# Patient Record
Sex: MALE | NOT HISPANIC OR LATINO | Employment: FULL TIME | URBAN - METROPOLITAN AREA
[De-identification: names, ages, dates, MRNs, and addresses within clinical notes are randomized per-mention and may not be internally consistent; named-entity substitution may affect disease eponyms.]

---

## 2020-02-17 ENCOUNTER — HOSPITAL ENCOUNTER (OUTPATIENT)
Dept: URGENT CARE | Facility: CLINIC | Age: 20
Discharge: HOME | End: 2020-02-17
Attending: NURSE PRACTITIONER

## 2020-02-20 LAB — BACTERIA SPEC AEROBE CULT: NORMAL

## 2021-05-07 ENCOUNTER — OFFICE VISIT CONVERTED (OUTPATIENT)
Dept: OTOLARYNGOLOGY | Facility: CLINIC | Age: 21
End: 2021-05-07
Attending: NURSE PRACTITIONER

## 2021-06-05 NOTE — H&P
History and Physical      Patient Name: Steven Collins   Patient ID: [id number]   Sex: Male   YOB: 2000    Primary Care Provider: Celine Alas   Referring Provider: Celine Alas    Visit Date: May 7, 2021    Provider: CAL Del Valle   Location: [facility]   Location Address: [address] [place] [address], [address] [place] [address]  [id number]   Location Phone: [phone]          Chief Complaint     1. epistaxis       History Of Present Illness  Steven Collins is a 20 year old /White male who presents to the office today as a consult from Celine Alas.      He presents to the clinic today for evaluation of epistaxis.  He states that this is been an ongoing issue since his teenage years.  He states he will have episodes of bleeding and then go many months without bleeding.  He states his last nosebleed was approximately 3 weeks ago and prior to that he had 3 or 4 in 1 week.  He reports that it always bleeds out of the left side.  He does report some chronic nasal congestion and often feels like he has a hard time breathing through his nose.  This is not worse on one particular side.  He denies any bleeding disorders and is not on any blood thinners.  He denies any nasal trauma.       Past Medical History  Epistaxis         Past Surgical History  Winslow Tooth Extraction         Medication List  cetirizine 10 mg oral tablet; Ocean Nasal 0.65 % nasal aerosol,spray         Allergy List  NO KNOWN DRUG ALLERGIES         Family Medical History  Family history of breast cancer; Family history of prostate cancer; Family history of diabetes mellitus         Social History  Tobacco (Never); Vapes (Former)         Review of Systems  · Constitutional  o Denies  o : fever, night sweats, weight loss  · Eyes  o Denies  o : discharge from eye, impaired vision  · HENT  o Admits  o : *See HPI  · Cardiovascular  o Denies  o : chest pain, irregular heart beats  · Respiratory  o Denies  o : shortness of  breath, wheezing, coughing up blood  · Gastrointestinal  o Denies  o : heartburn, reflux, vomiting blood  · Genitourinary  o Denies  o : frequency  · Integument  o Denies  o : rash, skin dryness  · Neurologic  o Admits  o : dizziness  o Denies  o : seizures, loss of balance, loss of consciousness  · Endocrine  o Denies  o : cold intolerance, heat intolerance  · Heme-Lymph  o Denies  o : easy bleeding, anemia      Vitals  Date Time BP Position Site L\R Cuff Size HR RR TEMP (F) WT  HT  BMI kg/m2 BSA m2 O2 Sat FR L/min FiO2 HC       05/07/2021 10:54 AM        97.7 171lbs 6oz 6'   23.24 1.99             Physical Examination  · Constitutional  o Appearance  o : well developed, well-nourished, alert and in no acute distress, voice clear and strong  · Head and Face  o Head  o :   § Inspection  § : no deformities or lesions  o Face  o :   § Inspection  § : No facial lesions; House-Brackmann I/VI bilaterally  § Palpation  § : No TMJ crepitus nor  muscle tenderness bilaterally  · Eyes  o Vision  o :   § Visual Fields  § : Extraocular movements are intact. No spontaneous or gaze-induced nystagmus.  o Conjunctivae  o : clear  o Sclerae  o : clear  o Pupils and Irises  o : pupils equal, round, and reactive to light.   · Ears, Nose, Mouth and Throat  o Ears  o :   § External Ears  § : appearance within normal limits, no lesions present  § Otoscopic Examination  § : tympanic membrane appearance within normal limits bilaterally without perforations, well-aerated middle ears  § Hearing  § : intact to conversational voice both ears  o Nose  o :   § External Nose  § : appearance normal  § Intranasal Exam  § : Prominent superficial blood vessels present on bilateral anterior nasal septums, worse on the left anterior septum, no intranasal lesions present, septum midline, sinuses non tender to percussion  o Oral Cavity  o :   § Oral Mucosa  § : oral mucosa normal without pallor or cyanosis  § Lips  § : lip appearance  normal  § Teeth  § : normal dentition for age  § Gums  § : gums pink, non-swollen, no bleeding present  § Tongue  § : tongue appearance normal; normal mobility  § Palate  § : hard palate normal, soft palate appearance normal with symmetric mobility  o Throat  o :   § Oropharynx  § : no inflammation or lesions present, tonsils within normal limits  · Neck  o Inspection/Palpation  o : normal appearance, no masses or tenderness, trachea midline; thyroid size normal, nontender, no nodules or masses present on palpation  · Respiratory  o Respiratory Effort  o : breathing unlabored  o Inspection of Chest  o : normal appearance, no retractions  · Lymphatic  o Neck  o : no lymphadenopathy present  o Supraclavicular Nodes  o : no lymphadenopathy present  o Preauricular Nodes  o : no lymphadenopathy present  · Skin and Subcutaneous Tissue  o General Inspection  o : Regarding face and neck - there are no rashes present, no lesions present, and no areas of discoloration  · Neurologic  o Cranial Nerves  o : cranial nerves II-XII are grossly intact bilaterally  o Gait and Station  o : normal gait, able to stand without diffculty  · Psychiatric  o Judgement and Insight  o : judgment and insight intact  o Mood and Affect  o : mood normal, affect appropriate          Assessment  · Epistaxis     784.7/R04.0      Plan  · Orders  o Nasal endoscopy [facility] ([id number]) - 784.7/R04.0 - 05/07/2021  · Medications  o Medications have been Reconciled  o Transition of Care or Provider Policy  · Instructions  o He presents to the clinic today for evaluation of epistaxis. He states that this is been an ongoing issue since his teenage years. He states he will have episodes of bleeding and then go many months without bleeding. He states his last nosebleed was approximately 3 weeks ago and prior to that he had 3 or 4 in 1 week. He reports that it always bleeds out of the left side. He does report some chronic nasal congestion and often feels like he has a  hard time breathing through his nose. This is not worse on one particular side. He denies any bleeding disorders and is not on any blood thinners. He denies any nasal trauma. On examination today he has superficial prominent blood vessels of present along anterior nasal septum bilaterally, worse on the left anterior nasal septum. I did perform a nasal endoscopy to look back further in the nose and this exam was normal. Based on his bleeding history from the left side we have discussed doing nasal cauterization using silver nitrate along the left anterior nasal septum. He would like to do this but does not want to do this today. I will have him get scheduled at his convenience and will perform nasal cautery. I will have him use nasal saline spray daily in the meantime.  o Electronically Identified Patient Education Materials Provided Electronically  · Correspondence  o ENT Letter to Referring MD (Celine Alas) - 05/07/2021            Electronically Signed by: CAL Del Valle -Author on May 7, 2021 11:44:53 AM

## 2021-06-08 ENCOUNTER — OFFICE VISIT (OUTPATIENT)
Dept: OTOLARYNGOLOGY | Facility: CLINIC | Age: 21
End: 2021-06-08

## 2021-06-08 VITALS — WEIGHT: 168.2 LBS | TEMPERATURE: 97.7 F | BODY MASS INDEX: 22.78 KG/M2 | HEIGHT: 72 IN

## 2021-06-08 DIAGNOSIS — R04.0 EPISTAXIS: Primary | ICD-10-CM

## 2021-06-08 PROBLEM — J30.9 ALLERGIC RHINITIS: Status: ACTIVE | Noted: 2021-06-08

## 2021-06-08 PROBLEM — F41.1 GENERALIZED ANXIETY DISORDER: Status: ACTIVE | Noted: 2021-06-08

## 2021-06-08 PROCEDURE — 30901 CONTROL OF NOSEBLEED: CPT | Performed by: NURSE PRACTITIONER

## 2021-06-08 RX ORDER — ECHINACEA PURPUREA EXTRACT 125 MG
TABLET ORAL EVERY 12 HOURS SCHEDULED
COMMUNITY
Start: 2021-04-20

## 2021-06-08 RX ORDER — BUSPIRONE HYDROCHLORIDE 5 MG/1
TABLET ORAL EVERY 8 HOURS SCHEDULED
COMMUNITY
Start: 2021-05-18

## 2021-06-08 NOTE — PROGRESS NOTES
"Patient Name: Steven Collins   Visit Date: 06/08/2021   Patient ID: [id number]  Provider: CAL Del Valle    Sex: male  Location: [facility]   YOB: 2000  Location Address: [address] [place] [address], [address] [place] [address],?[place]?[id number]    Primary Care Provider Celine Castellano APRN  Location Phone: [phone]    Referring Provider: No ref. provider found        Chief Complaint  Nose Bleed (f/u)    Subjective          Steven Collins is a 20 y.o. male who presents to [facility] for a follow-up visit of epistaxis.  Patient was last seen on 5/7/2021 at which time he was having persistent left-sided epistaxis.  On examination that day he did have a prominent superficial blood vessel present along the left anterior nasal septum.  We discussed performing nasal cautery but he did not want to have this done on that day.  He returns today to have nasal cautery performed.  He has not had any nosebleeds since last being seen.      Current Outpatient Medications on File Prior to Visit   Medication Sig   • busPIRone (BUSPAR) 5 MG tablet Every 8 (Eight) Hours.   • sodium chloride (Ocean Nasal Spray) 0.65 % nasal spray Every 12 (Twelve) Hours.     No current facility-administered medications on file prior to visit.        Social History     Tobacco Use   • Smoking status: Never Smoker   • Smokeless tobacco: Never Used   Vaping Use   • Vaping Use: Former   • Quit date: 1/1/2019   Substance Use Topics   • Alcohol use: Not on file   • Drug use: Not on file        Objective     Vital Signs:   Temp 97.7 °F (36.5 °C) (Tympanic)   Ht 182.9 cm (72\")   Wt 76.3 kg (168 lb 3.2 oz)   BMI 22.81 kg/m²       Physical Exam  Constitutional:       General: He is not in acute distress.     Appearance: Normal appearance. He is not ill-appearing.   HENT:      Head: Normocephalic and atraumatic.      Jaw: There is normal jaw occlusion. No tenderness or pain on movement.      Salivary " Glands: Right salivary gland is not diffusely enlarged or tender. Left salivary gland is not diffusely enlarged or tender.      Right Ear: Tympanic membrane, ear canal and external ear normal.      Left Ear: Tympanic membrane, ear canal and external ear normal.      Nose: Nose normal. No septal deviation.      Right Sinus: No maxillary sinus tenderness or frontal sinus tenderness.      Left Sinus: No maxillary sinus tenderness or frontal sinus tenderness.      Comments: Prominent superficial blood vessel present along left anterior nasal septum, cauterized using silver nitrate solution, neutralized, Triple Antibiotic ointment applied     Mouth/Throat:      Lips: Pink. No lesions.      Mouth: Mucous membranes are moist. No oral lesions.      Dentition: Normal dentition.      Tongue: No lesions.      Palate: No mass and lesions.      Pharynx: Oropharynx is clear. Uvula midline.      Tonsils: No tonsillar exudate.   Eyes:      Extraocular Movements: Extraocular movements intact.      Conjunctiva/sclera: Conjunctivae normal.      Pupils: Pupils are equal, round, and reactive to light.   Neck:      Thyroid: No thyroid mass, thyromegaly or thyroid tenderness.      Trachea: Trachea normal.   Pulmonary:      Effort: Pulmonary effort is normal. No respiratory distress.   Musculoskeletal:         General: Normal range of motion.      Cervical back: Normal range of motion and neck supple. No tenderness.   Lymphadenopathy:      Cervical: No cervical adenopathy.   Skin:     General: Skin is warm and dry.   Neurological:      General: No focal deficit present.      Mental Status: He is alert and oriented to person, place, and time.      Cranial Nerves: No cranial nerve deficit.      Motor: No weakness.      Gait: Gait normal.   Psychiatric:         Mood and Affect: Mood normal.         Behavior: Behavior normal.         Thought Content: Thought content normal.         Judgment: Judgment normal.          Control of  Epistaxis    Date/Time: 6/8/2021 3:03 PM  Performed by: Katharine Sexton APRN  Authorized by: Katharine Sexton APRN   Preparation: Patient was prepped and draped in the usual sterile fashion.  Local anesthesia used: no    Anesthesia:  Local anesthesia used: no  Patient tolerance: patient tolerated the procedure well with no immediate complications  Comments: Topical lidocaine used.  Nasal cautery with silver nitrate applied to left anterior nasal septum and neutralized.           Result Review :               Assessment and Plan    Diagnoses and all orders for this visit:    1. Epistaxis (Primary)  -     Control of Epistaxis          Follow Up   Return in about 6 weeks (around 7/20/2021).  Patient was given instructions and counseling regarding his condition or for health maintenance advice. Please see specific information pulled into the AVS if appropriate.

## 2021-06-08 NOTE — PATIENT INSTRUCTIONS
Nosebleed Fact Sheet    Nosebleeds are a common problem that we see in our clinic and can occur in any age group.  They can range from spotty bleeding to episodes of uncontrolled profuse bleeding.  Multiple medical conditions can contribute to nosebleeds and fortunately most of these can be controlled to limit and/or eliminate these bleeding episodes.    Most commonly bleeding occurs in the anterior or front part of the nose on the septum, or center wall of the nose.  This is because this area has several blood vessels vulnerable to both the drying effect of breathing, and to finger trauma of nose picking.  When this area become dry, the lining of the nose in this area can crack and cause the blood vessels underneath to bleed.    The following condition can contribute to and cause nosebleeds:  1. High Blood Pressure - When the blood pressure is high, the increased pressure can cause blood to be more easily pushed through a damaged blood vessel.  If your blood pressure is uncontrolled over 140 systolic, you may need to consult your primary-care physician to help limit your nosebleeds.  2. Low Platelets and Blood Clotting Factors - Certain medical conditions such as cancer and bleeding disorders can interfere with the body’s ability to form blood clots.  This interferes with the body’s own ability to stop nosebleeds.  3. Medications - Aspirin and aspirin type products such as nonsteroidal anti-inflammatory medications can interfere with body’s ability to form blood clots.  Nonsteroidal anti-inflammatory medications include medicines like ibuprofen (Motrin), naprosyn (Aleve), and Goody’s and BC powder.  Several cold and flu remedies also include aspirin.  If there’s a question, please ask you doctor or pharmacist.  Recently, it has been shown that some herbal medications, such as high doses of Vitamin E, can also interfere with the body’s ability to form clots.  Often times, these types of medications need to be  discontinued to help with nosebleed prevention.  4. Dryness - The nose needs to stay nice and moist to try to prevent any kind of cracking or injury to the nasal lining and underlying blood vessels.  The worst time of year for nosebleeds is in the wintertime when the humidity is low.  Occasionally, a nasal deviation can cause abnormal airflow that dries out an area on the septum and cause a nosebleed.  5. Trauma -One of the most common reasons for nosebleeds is trauma caused by nose picking or external injury.  If an area in your nose is irritated, scratching or picking it can cause it to easily bleed.  Recommendations for Preventing Nosebleeds:  1. Keep well hydrated by drinking at least six to eight glasses of water a day.  2. Increase the moisture of the nose by placing Vaseline in the front of the nose twice a day and irrigating the nose with nasal saline spray often (every 1-2 hrs).  3. Hold on taking aspirin or aspirin type products.  4. If you have high blood pressure, make sure this is well controlled.  5. Avoid nose picking and other forms of nasal trauma.  What to Do if Your Nose Bleeds:  1. Spray Afrin or a similar 12 hr nasal decongestant into the side that is bleeding.  2. With your thumb and forefinger, grasp the fleshy part of the nose and hold firm pressure.  If the bleeding is on the front part of the nose, it should make it stop.  Hold this pressure for 5 minutes on the clock then release.  If the nose is still bleeding, repeat.  If the nose is still bleeding after trying this 2-3 times, you may need to go to the emergency room for evaluation.  3. Call your doctor or go to the emergency room if you cannot get the bleeding to stop or if you feel dizzy or lightheaded after a large bleed.      Nosebleed, Adult  A nosebleed is when blood comes out of the nose. Nosebleeds are common. Usually, they are not a sign of a serious condition.  Nosebleeds can happen if a blood vessel in your nose starts to  bleed or if the lining of your nose (mucous membrane) cracks. They are commonly caused by:  · Allergies.  · Colds.  · Picking your nose.  · Blowing your nose too hard.  · An injury from sticking an object into your nose or getting hit in the nose.  · Dry or cold air.  Less common causes of nosebleeds include:  · Toxic fumes.  · Something abnormal in the nose or in the air-filled spaces in the bones of the face (sinuses).  · Growths in the nose, such as polyps.  · Blood thinners or conditions that cause blood to clot slowly.  · Certain illnesses or procedures that irritate or dry out the nasal passages.  Follow these instructions at home:  When you have a nosebleed:    · Sit down and tilt your head slightly forward.  · Use a clean towel or tissue to pinch your nostrils under the bony part of your nose. After 5 minutes, let go of your nose and see if bleeding starts again. Do not release pressure before that time. If there is still bleeding, repeat the pinching and holding for 5 minutes or until the bleeding stops.  · Do not place tissues or gauze in the nose to stop the bleeding.  · Avoid lying down and avoid tilting your head backward. That may make blood collect in the throat and cause gagging or coughing.  · Use a nasal spray decongestant to help with a nosebleed as told by your health care provider.  After a nosebleed:  1. Avoid blowing your nose or sniffing for a number of hours.  2. Avoid straining, lifting, or bending at the waist for several days. You may go back to other normal activities as you are able.  3. If you are taking aspirin or blood thinners and you have nosebleeds, talk to your health care provider. These medicines make bleeding more likely.  ? Ask your health care provider if you should stop taking the medicines or if you should adjust the dose.  ? Do not stop taking medicines that your health care provider has recommended unless he or she tells you to stop taking them.  4. If your nosebleed was  caused by dry mucous membranes, use over-the-counter saline nasal spray or gel and a humidifier as told by your health care provider. This will keep the mucous membranes moist and allow them to heal. If you need to use one of these products:  ? Choose one that is water-soluble.  ? Use only as much as you need and use it only as often as needed.  ? Do not lie down right after you use it.  5. If you get nosebleeds often, talk with your health care provider about medical treatments. Options may include:  ? Nasal cautery. This treatment stops and prevents nosebleeds by using a chemical swab or electrical device to lightly burn tiny blood vessels inside the nose.  ? Nasal packing. A gauze or other material is placed in the nose to keep constant pressure on the bleeding area.  Contact a health care provider if you:  · Have a fever.  · Get nosebleeds often or more often than usual.  · Bruise very easily.  · Have a nosebleed from having something stuck in your nose.  · Have bleeding in your mouth.  · Vomit or cough up brown material.  · Have a nosebleed after you start a new medicine.  Get help right away if:  · You have a nosebleed after a fall or a head injury.  · Your nosebleed does not go away after 20 minutes.  · You feel dizzy or weak.  · You have unusual bleeding from other parts of your body.  · You have unusual bruising on other parts of your body.  · You become sweaty.  · You vomit blood.  Summary  · A nosebleed is when blood comes out of the nose. Common causes include allergies, an injury to the nose, or cold or dry air.  · Initial treatment includes applying pressure for 5 minutes.  · Moisturizing the nose with saline nasal spray or gel after a nosebleed may help prevent future bleeding.  · Get help right away if your nosebleed does not go away after 20 minutes.  This information is not intended to replace advice given to you by your health care provider. Make sure you discuss any questions you have with your  health care provider.

## 2021-07-15 VITALS — BODY MASS INDEX: 23.21 KG/M2 | WEIGHT: 171.37 LBS | TEMPERATURE: 97.7 F | HEIGHT: 72 IN

## 2021-07-27 ENCOUNTER — OFFICE VISIT (OUTPATIENT)
Dept: OTOLARYNGOLOGY | Facility: CLINIC | Age: 21
End: 2021-07-27

## 2021-07-27 VITALS — WEIGHT: 171.6 LBS | HEIGHT: 72 IN | TEMPERATURE: 97.8 F | BODY MASS INDEX: 23.24 KG/M2

## 2021-07-27 DIAGNOSIS — R04.0 EPISTAXIS: Primary | ICD-10-CM

## 2021-07-27 PROBLEM — J45.20 MILD INTERMITTENT ASTHMA: Status: ACTIVE | Noted: 2021-07-27

## 2021-07-27 PROBLEM — G43.909 MIGRAINE: Status: ACTIVE | Noted: 2021-07-27

## 2021-07-27 PROBLEM — R41.840 ATTENTION AND CONCENTRATION DEFICIT: Status: ACTIVE | Noted: 2021-07-27

## 2021-07-27 PROBLEM — F33.9 RECURRENT MAJOR DEPRESSION: Status: ACTIVE | Noted: 2021-07-27

## 2021-07-27 PROCEDURE — 99212 OFFICE O/P EST SF 10 MIN: CPT | Performed by: NURSE PRACTITIONER

## 2021-07-27 RX ORDER — CETIRIZINE HYDROCHLORIDE 10 MG/1
10 TABLET ORAL DAILY
COMMUNITY
Start: 2021-04-20

## 2021-07-27 NOTE — PROGRESS NOTES
Patient Name: Steven Collins   Visit Date: 07/27/2021   Patient ID: [id number]  Provider: CAL Del Valle    Sex: male  Location: [facility]   YOB: 2000  Location Address: [address] [place] [address], [address] [place] [address],?[place]?[id number]    Primary Care Provider Celine Castellano APRN  Location Phone: [phone]    Referring Provider: No ref. provider found        Chief Complaint  No chief complaint on file.    Subjective          Steven Collins is a 21 y.o. male who presents to [facility] for a follow-up visit of left-sided epistaxis.  He was last seen on 6/8/2021 at which time I performed nasal cautery to the left-sided anterior nasal septum.  He states that since that time he has only had 1 small nosebleed from the left side.  He states that it stopped bleeding within a couple minutes.  He reports he has been doing well.      Current Outpatient Medications on File Prior to Visit   Medication Sig   • busPIRone (BUSPAR) 5 MG tablet Every 8 (Eight) Hours.   • sodium chloride (Ocean Nasal Spray) 0.65 % nasal spray Every 12 (Twelve) Hours.     No current facility-administered medications on file prior to visit.        Social History     Tobacco Use   • Smoking status: Never Smoker   • Smokeless tobacco: Never Used   Vaping Use   • Vaping Use: Former   • Quit date: 1/1/2019   Substance Use Topics   • Alcohol use: Not on file   • Drug use: Not on file        Objective     Vital Signs:   There were no vitals taken for this visit.      Physical Exam  Constitutional:       General: He is not in acute distress.     Appearance: Normal appearance. He is not ill-appearing.   HENT:      Head: Normocephalic and atraumatic.      Jaw: There is normal jaw occlusion. No tenderness or pain on movement.      Salivary Glands: Right salivary gland is not diffusely enlarged or tender. Left salivary gland is not diffusely enlarged or tender.      Right Ear: Tympanic membrane, ear  canal and external ear normal.      Left Ear: Tympanic membrane, ear canal and external ear normal.      Nose: Nose normal. No septal deviation.      Right Sinus: No maxillary sinus tenderness or frontal sinus tenderness.      Left Sinus: No maxillary sinus tenderness or frontal sinus tenderness.      Mouth/Throat:      Lips: Pink. No lesions.      Mouth: Mucous membranes are moist. No oral lesions.      Dentition: Normal dentition.      Tongue: No lesions.      Palate: No mass and lesions.      Pharynx: Oropharynx is clear. Uvula midline.      Tonsils: No tonsillar exudate.   Eyes:      Extraocular Movements: Extraocular movements intact.      Conjunctiva/sclera: Conjunctivae normal.      Pupils: Pupils are equal, round, and reactive to light.   Neck:      Thyroid: No thyroid mass, thyromegaly or thyroid tenderness.      Trachea: Trachea normal.   Pulmonary:      Effort: Pulmonary effort is normal. No respiratory distress.   Musculoskeletal:         General: Normal range of motion.      Cervical back: Normal range of motion and neck supple. No tenderness.   Lymphadenopathy:      Cervical: No cervical adenopathy.   Skin:     General: Skin is warm and dry.   Neurological:      General: No focal deficit present.      Mental Status: He is alert and oriented to person, place, and time.      Cranial Nerves: No cranial nerve deficit.      Motor: No weakness.      Gait: Gait normal.   Psychiatric:         Mood and Affect: Mood normal.         Behavior: Behavior normal.         Thought Content: Thought content normal.         Judgment: Judgment normal.                Result Review :               Assessment and Plan    Diagnoses and all orders for this visit:    1. Epistaxis (Primary)           Follow Up   No follow-ups on file.  Patient was given instructions and counseling regarding his condition or for health maintenance advice. Please see specific information pulled into the AVS if appropriate.     CAL Del Valle

## 2022-12-07 ENCOUNTER — OFFICE VISIT (OUTPATIENT)
Dept: ORTHOPEDIC SURGERY | Facility: CLINIC | Age: 22
End: 2022-12-07

## 2022-12-07 VITALS — OXYGEN SATURATION: 100 % | HEIGHT: 72 IN | WEIGHT: 157 LBS | BODY MASS INDEX: 21.26 KG/M2 | HEART RATE: 66 BPM

## 2022-12-07 DIAGNOSIS — M25.561 PAIN OF RIGHT PATELLOFEMORAL JOINT: ICD-10-CM

## 2022-12-07 DIAGNOSIS — M25.562 LEFT KNEE PAIN, UNSPECIFIED CHRONICITY: Primary | ICD-10-CM

## 2022-12-07 DIAGNOSIS — M94.262 CHONDROMALACIA OF LEFT KNEE: ICD-10-CM

## 2022-12-07 DIAGNOSIS — M22.2X2 PATELLOFEMORAL PAIN SYNDROME OF LEFT KNEE: ICD-10-CM

## 2022-12-07 PROCEDURE — 99203 OFFICE O/P NEW LOW 30 MIN: CPT | Performed by: STUDENT IN AN ORGANIZED HEALTH CARE EDUCATION/TRAINING PROGRAM

## 2022-12-07 PROCEDURE — 20610 DRAIN/INJ JOINT/BURSA W/O US: CPT | Performed by: STUDENT IN AN ORGANIZED HEALTH CARE EDUCATION/TRAINING PROGRAM

## 2022-12-07 RX ORDER — DICLOFENAC SODIUM 75 MG/1
75 TABLET, DELAYED RELEASE ORAL 2 TIMES DAILY
COMMUNITY
Start: 2022-09-07

## 2022-12-07 RX ADMIN — TRIAMCINOLONE ACETONIDE 40 MG: 40 INJECTION, SUSPENSION INTRA-ARTICULAR; INTRAMUSCULAR at 14:02

## 2022-12-07 RX ADMIN — LIDOCAINE HYDROCHLORIDE 5 ML: 10 INJECTION, SOLUTION INFILTRATION; PERINEURAL at 14:02

## 2022-12-07 NOTE — PROGRESS NOTES
"Chief Complaint  Pain and Initial Evaluation of the Left Knee    Subjective          Steven Collins presents to [facility] for   History of Present Illness    The patient presents here today for evaluation of the left knee. The patient reports his knee pain began in 2015 and was diagnosed with PFS and was treated with physical therapy. He reports it has flared back up again. He states it now feels like his knee is vibrating. He has tried OTC NSAIDS and diclofenac without relief.     No Known Allergies     Social History     Socioeconomic History   • Marital status: Single   Tobacco Use   • Smoking status: Never   • Smokeless tobacco: Never   Vaping Use   • Vaping Use: Former   • Quit date: 1/1/2019   Substance and Sexual Activity   • Alcohol use: Never   • Drug use: Never        I reviewed the patient's chief complaint, history of present illness, review of systems, past medical history, surgical history, family history, social history, medications, and allergy list.     REVIEW OF SYSTEMS    Constitutional: Denies fevers, chills, weight loss  Cardiovascular: Denies chest pain, shortness of breath  Skin: Denies rashes, acute skin changes  Neurologic: Denies headache, loss of consciousness  MSK: Left knee pain      Objective   Vital Signs:   Pulse 66   Ht 182.9 cm (72\")   Wt 71.2 kg (157 lb)   SpO2 100%   BMI 21.29 kg/m²     Body mass index is 21.29 kg/m².    Physical Exam    General: Alert. No acute distress.   Left knee- Stable to varus/valgus stress. Stable to anterior/posterior drawer. Positive EHL, FHL, GS and TA. Sensation intact to all 5 nerves of the foot. Positive pulses. Neurovascularly intact. Knee Extensor Mechanism  Intact. Negative Lachman's. Negative Cruz's. ROM 0-130 degrees. Mild crepitus. No effusion. Neutral alignment.      Large Joint Arthrocentesis: L knee  Date/Time: 12/7/2022 2:02 PM  Consent given by: patient  Site marked: site marked  Timeout: Immediately " prior to procedure a time out was called to verify the correct patient, procedure, equipment, support staff and site/side marked as required   Supporting Documentation  Indications: pain   Procedure Details  Location: knee - L knee  Needle gauge: 21g.  Medications administered: 5 mL lidocaine 1 %; 40 mg triamcinolone acetonide 40 MG/ML  Patient tolerance: patient tolerated the procedure well with no immediate complications          Imaging Results (Most Recent)     Procedure Component Value Units Date/Time    XR Knee 4+ View Left [[id number]] Resulted: 12/07/22 1435     Updated: 12/07/22 1436    Narrative:      Indications: Left knee pain    Views: Weightbearing AP, PA flexion, lateral, sunrise left knee    Findings: No fractures noted.  No arthritic changes seen.  The patella   tracks centrally on the sunrise view.  Alignment is neutral.    Comparative Data: No comparative data available                     Assessment and Plan {CC Problem List  Visit Diagnosis   ROS  Review (Popup)  [facility] Maintenance  Quality  BestPractice  Medications  SmartSets  SnapShot Encounters  Media :23}       XR Knee 4+ View Left    Result Date: 12/7/2022  Narrative: Indications: Left knee pain Views: Weightbearing AP, PA flexion, lateral, sunrise left knee Findings: No fractures noted.  No arthritic changes seen.  The patella tracks centrally on the sunrise view.  Alignment is neutral. Comparative Data: No comparative data available        Diagnoses and all orders for this visit:    1. Left knee pain, unspecified chronicity (Primary)  -     XR Knee 4+ View Left    2. Patellofemoral pain syndrome of left knee  -     Ambulatory Referral to Physical Therapy Evaluate and treat, Ortho; Stretching, ROM, Strengthening; Full weight bearing    3. Chondromalacia of left knee  -     Ambulatory Referral to Physical Therapy Evaluate and treat, Ortho; Stretching, ROM, Strengthening; Full weight bearing    4. Pain of right patellofemoral  joint  -     Ambulatory Referral to Physical Therapy Evaluate and treat, Ortho; Stretching, ROM, Strengthening; Full weight bearing    Other orders  -     Large Joint Arthrocentesis: L knee        Discussed the treatment plan with the patient.  I reviewed the x-rays that were obtained today with the patient. Plan for conservative treatment at this time. Discussed the risks and benefits of a left knee steroid injection. The patient expressed understanding and wished to proceed. He tolerated the injection well. Order for physical therapy given today.     Call or return if worsening symptoms.    Scribed for Fabio Andre MD by Julieta Santillan  12/07/2022   13:55 EST         Follow Up       8 weeks    Patient was given instructions and counseling regarding his condition or for health maintenance advice. Please see specific information pulled into the AVS if appropriate.       I have personally performed the services described in this document as scribed by the above individual and it is both accurate and complete.     Fabio Andre MD  12/08/22  15:14 EST

## 2022-12-08 RX ORDER — TRIAMCINOLONE ACETONIDE 40 MG/ML
40 INJECTION, SUSPENSION INTRA-ARTICULAR; INTRAMUSCULAR
Status: COMPLETED | OUTPATIENT
Start: 2022-12-07 | End: 2022-12-07

## 2022-12-08 RX ORDER — LIDOCAINE HYDROCHLORIDE 10 MG/ML
5 INJECTION, SOLUTION INFILTRATION; PERINEURAL
Status: COMPLETED | OUTPATIENT
Start: 2022-12-07 | End: 2022-12-07

## 2023-03-22 ENCOUNTER — OFFICE VISIT (OUTPATIENT)
Dept: ORTHOPEDIC SURGERY | Facility: CLINIC | Age: 23
End: 2023-03-22
Payer: COMMERCIAL

## 2023-03-22 VITALS — WEIGHT: 155 LBS | BODY MASS INDEX: 20.99 KG/M2 | HEIGHT: 72 IN | OXYGEN SATURATION: 98 %

## 2023-03-22 DIAGNOSIS — M94.262 CHONDROMALACIA OF LEFT KNEE: Primary | ICD-10-CM

## 2023-03-22 PROCEDURE — 99213 OFFICE O/P EST LOW 20 MIN: CPT | Performed by: STUDENT IN AN ORGANIZED HEALTH CARE EDUCATION/TRAINING PROGRAM

## 2023-03-22 NOTE — PROGRESS NOTES
"Chief Complaint  Pain and Follow-up of the Left Knee    Subjective          Steven Collins presents to [facility] for   History of Present Illness    The patient presents here today for follow up evaluation of the left knee. The patient has been treating his left knee osteoarthritis conservatively. He had an injection in December. He is no longer attending therapy. He is still having some pain.     No Known Allergies     Social History     Socioeconomic History   • Marital status: Single   Tobacco Use   • Smoking status: Never   • Smokeless tobacco: Never   Vaping Use   • Vaping Use: Former   • Quit date: 1/1/2019   Substance and Sexual Activity   • Alcohol use: Never   • Drug use: Never        I reviewed the patient's chief complaint, history of present illness, review of systems, past medical history, surgical history, family history, social history, medications, and allergy list.     REVIEW OF SYSTEMS    Constitutional: Denies fevers, chills, weight loss  Cardiovascular: Denies chest pain, shortness of breath  Skin: Denies rashes, acute skin changes  Neurologic: Denies headache, loss of consciousness  MSK: Left knee pain      Objective   Vital Signs:   Ht 182.9 cm (72\")   Wt 70.3 kg (155 lb)   SpO2 98%   BMI 21.02 kg/m²     Body mass index is 21.02 kg/m².    Physical Exam    General: Alert. No acute distress.   Left knee- Patellar click with motion. ROM 0-120 degrees. Stable to varus/valgus stress. Stable to anterior/posterior drawer. No joint line pain. Negative Cruz's. Negative Lachman's. Positive EHL, FHL, GS and TA. Sensation intact to all 5 nerves of the foot. Positive pulses. Knee Extensor Mechanism  Intact.     Procedures    Imaging Results (Most Recent)     None                   Assessment and Plan        No results found.     Diagnoses and all orders for this visit:    1. Chondromalacia of left knee (Primary)        Discussed the treatment plan with the patient.  Plan to " continue conservative treatment at this time. Plan to continue home exercises, OTC medications, repeat injections and activity modifications.           Call or return if worsening symptoms.    Scribed for Fabio Andre MD by Julieta Santillan  03/22/2023   08:37 EDT         Follow Up       PRN    Patient was given instructions and counseling regarding his condition or for health maintenance advice. Please see specific information pulled into the AVS if appropriate.       I have personally performed the services described in this document as scribed by the above individual and it is both accurate and complete.     Fabio Andre MD  03/22/23  12:54 EDT